# Patient Record
Sex: FEMALE | Race: WHITE | NOT HISPANIC OR LATINO | Employment: OTHER | URBAN - METROPOLITAN AREA
[De-identification: names, ages, dates, MRNs, and addresses within clinical notes are randomized per-mention and may not be internally consistent; named-entity substitution may affect disease eponyms.]

---

## 2023-11-16 RX ORDER — NAPROXEN SODIUM 220 MG/1
81 TABLET, FILM COATED ORAL DAILY
COMMUNITY

## 2023-11-16 RX ORDER — LISINOPRIL 20 MG/1
20 TABLET ORAL DAILY
COMMUNITY

## 2023-11-16 RX ORDER — PANTOPRAZOLE SODIUM 40 MG/1
40 TABLET, DELAYED RELEASE ORAL DAILY
COMMUNITY

## 2023-11-16 RX ORDER — ATORVASTATIN CALCIUM 80 MG/1
80 TABLET, FILM COATED ORAL DAILY
COMMUNITY

## 2023-11-16 RX ORDER — AMLODIPINE BESYLATE 2.5 MG/1
2.5 TABLET ORAL DAILY
COMMUNITY

## 2023-11-16 RX ORDER — EZETIMIBE 10 MG/1
10 TABLET ORAL DAILY
COMMUNITY

## 2023-11-20 NOTE — DISCHARGE INSTRUCTIONS
FOOT SURGERY HOME CARE INSTRUCTIONS     ACTIVITY. Keep your affected foot elevated above the level of your heart over the next 48 hours. Apply an ice bag as often as possible for the next 48 hours. If you received a surgical boot or shoe, wear it until your next visit or until instructed otherwise. You may remove the surgical boot or shoe when resting and to sleep. Exercise your legs frequently by bending your knees to stimulate circulation and speed healing. You may put partial weight on your affected foot.     DIET. At home, continue with liquids. If you do not have any nausea, you may eat a soft diet. Gradually resume your regular diet. Do not drink alcohol beverages.     CARE OF INCISION. Keep your bandage clean, dry, and intact until your follow up appointment. Do not remove your bandages or inspect the wound. A small amount of blood on the bandage is normal. Limited swelling may occur and your skin may look bruised. Limited swelling and bruising are normal and expected. Do not smoke.     BATHING. Sponge bathe until your follow up appointment. You may take a shower when told by your doctor. No tub baths, swimming, or hot tubs for 2 weeks or when told by your doctor.     MEDICATION. You will receive instructions for your pain and/or antibiotic prescriptions. Take pain medication only if you need it and as directed. Take antibiotics as directed until all the pills are gone. If your medications cause stomach upset, headache, rash, or other abnormal reactions, stop taking them and call your doctor immediately.     WHEN TO CALL THE DOCTOR   Pain, burning, or numbness of the toes not relieved by elevation of the leg   Pale or cold toes, bluish nail beds   Red line going up leg   Excessive swelling   Fever over 100.4 degrees or higher and chills  Pain not relieved by the pain medication   Excessive bloody drainage or bandage become overly stained with bloody fluids   Your cast/bandage gets wet or you bump or injure  the surgical site

## 2023-11-21 ENCOUNTER — HOSPITAL ENCOUNTER (OUTPATIENT)
Facility: HOSPITAL | Age: 73
Discharge: HOME | End: 2023-11-21
Attending: PODIATRIST | Admitting: PODIATRIST
Payer: MEDICARE

## 2023-11-21 ENCOUNTER — ANESTHESIA (OUTPATIENT)
Dept: SURGERY | Facility: HOSPITAL | Age: 73
End: 2023-11-21
Payer: MEDICARE

## 2023-11-21 ENCOUNTER — ANESTHESIA EVENT (OUTPATIENT)
Dept: SURGERY | Facility: HOSPITAL | Age: 73
End: 2023-11-21
Payer: MEDICARE

## 2023-11-21 DIAGNOSIS — M20.22 HALLUX RIGIDUS OF LEFT FOOT: Primary | Chronic | ICD-10-CM

## 2023-11-21 PROCEDURE — 37000008 HC ANESTHESIA 1ST 15 MINUTES: Performed by: PODIATRIST

## 2023-11-21 PROCEDURE — 63600175 PHARM REV CODE 636 W HCPCS: Performed by: ANESTHESIOLOGY

## 2023-11-21 PROCEDURE — 63600175 PHARM REV CODE 636 W HCPCS: Performed by: NURSE ANESTHETIST, CERTIFIED REGISTERED

## 2023-11-21 PROCEDURE — 63600175 PHARM REV CODE 636 W HCPCS: Performed by: PODIATRIST

## 2023-11-21 PROCEDURE — D9220A PRA ANESTHESIA: ICD-10-PCS | Mod: ANES,,, | Performed by: ANESTHESIOLOGY

## 2023-11-21 PROCEDURE — D9220A PRA ANESTHESIA: ICD-10-PCS | Mod: CRNA,,, | Performed by: NURSE ANESTHETIST, CERTIFIED REGISTERED

## 2023-11-21 PROCEDURE — 36000708 HC OR TIME LEV III 1ST 15 MIN: Performed by: PODIATRIST

## 2023-11-21 PROCEDURE — 37000009 HC ANESTHESIA EA ADD 15 MINS: Performed by: PODIATRIST

## 2023-11-21 PROCEDURE — 25000003 PHARM REV CODE 250: Performed by: ANESTHESIOLOGY

## 2023-11-21 PROCEDURE — 25000003 PHARM REV CODE 250: Performed by: NURSE ANESTHETIST, CERTIFIED REGISTERED

## 2023-11-21 PROCEDURE — D9220A PRA ANESTHESIA: Mod: ANES,,, | Performed by: ANESTHESIOLOGY

## 2023-11-21 PROCEDURE — D9220A PRA ANESTHESIA: Mod: CRNA,,, | Performed by: NURSE ANESTHETIST, CERTIFIED REGISTERED

## 2023-11-21 PROCEDURE — 27201423 OPTIME MED/SURG SUP & DEVICES STERILE SUPPLY: Performed by: PODIATRIST

## 2023-11-21 PROCEDURE — 71000016 HC POSTOP RECOV ADDL HR: Performed by: PODIATRIST

## 2023-11-21 PROCEDURE — C1713 ANCHOR/SCREW BN/BN,TIS/BN: HCPCS | Performed by: PODIATRIST

## 2023-11-21 PROCEDURE — 71000033 HC RECOVERY, INTIAL HOUR: Performed by: PODIATRIST

## 2023-11-21 PROCEDURE — 25000003 PHARM REV CODE 250: Performed by: PODIATRIST

## 2023-11-21 PROCEDURE — 36000709 HC OR TIME LEV III EA ADD 15 MIN: Performed by: PODIATRIST

## 2023-11-21 PROCEDURE — 71000015 HC POSTOP RECOV 1ST HR: Performed by: PODIATRIST

## 2023-11-21 DEVICE — IMPLANTABLE DEVICE: Type: IMPLANTABLE DEVICE | Site: FOOT | Status: FUNCTIONAL

## 2023-11-21 RX ORDER — SODIUM CHLORIDE, SODIUM LACTATE, POTASSIUM CHLORIDE, CALCIUM CHLORIDE 600; 310; 30; 20 MG/100ML; MG/100ML; MG/100ML; MG/100ML
INJECTION, SOLUTION INTRAVENOUS CONTINUOUS
Status: DISCONTINUED | OUTPATIENT
Start: 2023-11-21 | End: 2023-11-21 | Stop reason: HOSPADM

## 2023-11-21 RX ORDER — HYDROMORPHONE HYDROCHLORIDE 2 MG/ML
0.2 INJECTION, SOLUTION INTRAMUSCULAR; INTRAVENOUS; SUBCUTANEOUS EVERY 10 MIN PRN
Status: DISCONTINUED | OUTPATIENT
Start: 2023-11-21 | End: 2023-11-21

## 2023-11-21 RX ORDER — ONDANSETRON 2 MG/ML
4 INJECTION INTRAMUSCULAR; INTRAVENOUS ONCE
Status: COMPLETED | OUTPATIENT
Start: 2023-11-21 | End: 2023-11-21

## 2023-11-21 RX ORDER — DEXAMETHASONE SODIUM PHOSPHATE 4 MG/ML
INJECTION, SOLUTION INTRA-ARTICULAR; INTRALESIONAL; INTRAMUSCULAR; INTRAVENOUS; SOFT TISSUE
Status: DISCONTINUED | OUTPATIENT
Start: 2023-11-21 | End: 2023-11-21

## 2023-11-21 RX ORDER — MEPERIDINE HYDROCHLORIDE 25 MG/ML
12.5 INJECTION INTRAMUSCULAR; INTRAVENOUS; SUBCUTANEOUS EVERY 10 MIN PRN
Status: DISCONTINUED | OUTPATIENT
Start: 2023-11-21 | End: 2023-11-21 | Stop reason: HOSPADM

## 2023-11-21 RX ORDER — CEFAZOLIN SODIUM 1 G/3ML
INJECTION, POWDER, FOR SOLUTION INTRAMUSCULAR; INTRAVENOUS
Status: DISCONTINUED | OUTPATIENT
Start: 2023-11-21 | End: 2023-11-21 | Stop reason: HOSPADM

## 2023-11-21 RX ORDER — CELECOXIB 100 MG/1
100 CAPSULE ORAL ONCE
Status: DISCONTINUED | OUTPATIENT
Start: 2023-11-21 | End: 2023-11-21

## 2023-11-21 RX ORDER — LIDOCAINE HYDROCHLORIDE 10 MG/ML
INJECTION INFILTRATION; PERINEURAL
Status: DISCONTINUED
Start: 2023-11-21 | End: 2023-11-21 | Stop reason: HOSPADM

## 2023-11-21 RX ORDER — ACETAMINOPHEN 325 MG/1
650 TABLET ORAL
Status: COMPLETED | OUTPATIENT
Start: 2023-11-21 | End: 2023-11-21

## 2023-11-21 RX ORDER — SODIUM CHLORIDE, SODIUM LACTATE, POTASSIUM CHLORIDE, CALCIUM CHLORIDE 600; 310; 30; 20 MG/100ML; MG/100ML; MG/100ML; MG/100ML
INJECTION, SOLUTION INTRAVENOUS CONTINUOUS
Status: ACTIVE | OUTPATIENT
Start: 2023-11-21 | End: 2023-11-21

## 2023-11-21 RX ORDER — PROPOFOL 10 MG/ML
VIAL (ML) INTRAVENOUS
Status: DISCONTINUED | OUTPATIENT
Start: 2023-11-21 | End: 2023-11-21

## 2023-11-21 RX ORDER — FENTANYL CITRATE 50 UG/ML
INJECTION, SOLUTION INTRAMUSCULAR; INTRAVENOUS
Status: DISCONTINUED | OUTPATIENT
Start: 2023-11-21 | End: 2023-11-21

## 2023-11-21 RX ORDER — CEFAZOLIN SODIUM 1 G/3ML
1 INJECTION, POWDER, FOR SOLUTION INTRAMUSCULAR; INTRAVENOUS
Status: COMPLETED | OUTPATIENT
Start: 2023-11-21 | End: 2023-11-21

## 2023-11-21 RX ORDER — CEFAZOLIN SODIUM 1 G/3ML
INJECTION, POWDER, FOR SOLUTION INTRAMUSCULAR; INTRAVENOUS
Status: DISCONTINUED
Start: 2023-11-21 | End: 2023-11-21 | Stop reason: HOSPADM

## 2023-11-21 RX ORDER — BUPIVACAINE HYDROCHLORIDE 5 MG/ML
INJECTION, SOLUTION EPIDURAL; INTRACAUDAL
Status: DISCONTINUED
Start: 2023-11-21 | End: 2023-11-21 | Stop reason: HOSPADM

## 2023-11-21 RX ORDER — LIDOCAINE HYDROCHLORIDE 10 MG/ML
INJECTION, SOLUTION EPIDURAL; INFILTRATION; INTRACAUDAL; PERINEURAL
Status: DISCONTINUED | OUTPATIENT
Start: 2023-11-21 | End: 2023-11-21

## 2023-11-21 RX ORDER — SODIUM CHLORIDE 9 MG/ML
INJECTION, SOLUTION INTRAMUSCULAR; INTRAVENOUS; SUBCUTANEOUS
Status: DISCONTINUED
Start: 2023-11-21 | End: 2023-11-21 | Stop reason: HOSPADM

## 2023-11-21 RX ORDER — EPHEDRINE SULFATE 50 MG/ML
INJECTION, SOLUTION INTRAVENOUS
Status: DISCONTINUED | OUTPATIENT
Start: 2023-11-21 | End: 2023-11-21

## 2023-11-21 RX ORDER — LIDOCAINE HYDROCHLORIDE 10 MG/ML
INJECTION INFILTRATION; PERINEURAL
Status: DISCONTINUED | OUTPATIENT
Start: 2023-11-21 | End: 2023-11-21 | Stop reason: HOSPADM

## 2023-11-21 RX ORDER — ONDANSETRON 2 MG/ML
4 INJECTION INTRAMUSCULAR; INTRAVENOUS ONCE AS NEEDED
Status: DISCONTINUED | OUTPATIENT
Start: 2023-11-21 | End: 2023-11-21 | Stop reason: HOSPADM

## 2023-11-21 RX ORDER — HYDROMORPHONE HYDROCHLORIDE 2 MG/ML
0.4 INJECTION, SOLUTION INTRAMUSCULAR; INTRAVENOUS; SUBCUTANEOUS EVERY 5 MIN PRN
Status: DISCONTINUED | OUTPATIENT
Start: 2023-11-21 | End: 2023-11-21 | Stop reason: HOSPADM

## 2023-11-21 RX ORDER — CELECOXIB 200 MG/1
200 CAPSULE ORAL ONCE
Status: COMPLETED | OUTPATIENT
Start: 2023-11-21 | End: 2023-11-21

## 2023-11-21 RX ORDER — METHOCARBAMOL 100 MG/ML
1000 INJECTION, SOLUTION INTRAMUSCULAR; INTRAVENOUS ONCE AS NEEDED
Status: COMPLETED | OUTPATIENT
Start: 2023-11-21 | End: 2023-11-21

## 2023-11-21 RX ORDER — BUPIVACAINE HYDROCHLORIDE 5 MG/ML
INJECTION, SOLUTION EPIDURAL; INTRACAUDAL
Status: DISCONTINUED | OUTPATIENT
Start: 2023-11-21 | End: 2023-11-21 | Stop reason: HOSPADM

## 2023-11-21 RX ADMIN — CEFAZOLIN 1 G: 330 INJECTION, POWDER, FOR SOLUTION INTRAMUSCULAR; INTRAVENOUS at 01:11

## 2023-11-21 RX ADMIN — SODIUM CHLORIDE, POTASSIUM CHLORIDE, SODIUM LACTATE AND CALCIUM CHLORIDE: 600; 310; 30; 20 INJECTION, SOLUTION INTRAVENOUS at 02:11

## 2023-11-21 RX ADMIN — EPHEDRINE SULFATE 10 MG: 50 INJECTION INTRAVENOUS at 01:11

## 2023-11-21 RX ADMIN — ACETAMINOPHEN 650 MG: 325 TABLET, FILM COATED ORAL at 12:11

## 2023-11-21 RX ADMIN — ONDANSETRON 4 MG: 2 INJECTION INTRAMUSCULAR; INTRAVENOUS at 12:11

## 2023-11-21 RX ADMIN — CELECOXIB 200 MG: 200 CAPSULE ORAL at 12:11

## 2023-11-21 RX ADMIN — FENTANYL CITRATE 50 MCG: 50 INJECTION, SOLUTION INTRAMUSCULAR; INTRAVENOUS at 01:11

## 2023-11-21 RX ADMIN — EPHEDRINE SULFATE 5 MG: 50 INJECTION INTRAVENOUS at 01:11

## 2023-11-21 RX ADMIN — SODIUM CHLORIDE, POTASSIUM CHLORIDE, SODIUM LACTATE AND CALCIUM CHLORIDE: 600; 310; 30; 20 INJECTION, SOLUTION INTRAVENOUS at 01:11

## 2023-11-21 RX ADMIN — DEXAMETHASONE SODIUM PHOSPHATE 4 MG: 4 INJECTION, SOLUTION INTRA-ARTICULAR; INTRALESIONAL; INTRAMUSCULAR; INTRAVENOUS; SOFT TISSUE at 01:11

## 2023-11-21 RX ADMIN — METHOCARBAMOL 1000 MG: 100 INJECTION INTRAMUSCULAR; INTRAVENOUS at 03:11

## 2023-11-21 RX ADMIN — SODIUM CHLORIDE, POTASSIUM CHLORIDE, SODIUM LACTATE AND CALCIUM CHLORIDE: 600; 310; 30; 20 INJECTION, SOLUTION INTRAVENOUS at 12:11

## 2023-11-21 RX ADMIN — LIDOCAINE HYDROCHLORIDE 50 MG: 10 INJECTION, SOLUTION EPIDURAL; INFILTRATION; INTRACAUDAL; PERINEURAL at 01:11

## 2023-11-21 RX ADMIN — PROPOFOL 150 MG: 10 INJECTION, EMULSION INTRAVENOUS at 01:11

## 2023-11-21 NOTE — PLAN OF CARE
Problem: Infection  Goal: Absence of Infection Signs and Symptoms  Outcome: Met     Problem: Fall Injury Risk  Goal: Absence of Fall and Fall-Related Injury  Outcome: Met     Problem: Pain Acute  Goal: Acceptable Pain Control and Functional Ability  Outcome: Met     Problem: Hypertension Comorbidity  Goal: Blood Pressure in Desired Range  Outcome: Met     Problem: Adult Inpatient Plan of Care  Goal: Plan of Care Review  Outcome: Met  Goal: Patient-Specific Goal (Individualized)  Outcome: Met  Goal: Absence of Hospital-Acquired Illness or Injury  Outcome: Met  Goal: Optimal Comfort and Wellbeing  Outcome: Met  Goal: Readiness for Transition of Care  Outcome: Met

## 2023-11-21 NOTE — ANESTHESIA PREPROCEDURE EVALUATION
"                                                                                                             2023  Blanca Covington is a 72 y.o., female.  Pre-operative evaluation for Procedure(s) (LRB):  ARTHROPLASTY, MTP JOINT Tania and chielectomy with total joint implant Left foot (Left)  CHEILECTOMY (Left)    Blanca Covington is a 72 y.o. female     There is no problem list on file for this patient.      Review of patient's allergies indicates:  No Known Allergies    No current facility-administered medications on file prior to encounter.     Current Outpatient Medications on File Prior to Encounter   Medication Sig Dispense Refill    amLODIPine (NORVASC) 2.5 MG tablet Take 2.5 mg by mouth once daily.      aspirin 81 MG Chew Take 81 mg by mouth once daily.      atorvastatin (LIPITOR) 80 MG tablet Take 80 mg by mouth once daily.      ezetimibe (ZETIA) 10 mg tablet Take 10 mg by mouth once daily.      lisinopriL (PRINIVIL,ZESTRIL) 20 MG tablet Take 20 mg by mouth once daily.      multivitamin capsule Take 1 capsule by mouth once daily.      pantoprazole (PROTONIX) 40 MG tablet Take 40 mg by mouth once daily.         Past Surgical History:   Procedure Laterality Date    CATARACT EXTRACTION Bilateral     CERVICAL SPINE SURGERY       SECTION      FOOT SURGERY Right     LITHOTRIPSY Right      CBC: No results for input(s): "WBC", "RBC", "HGB", "HCT", "PLT", "MCV", "MCH", "MCHC" in the last 72 hours.    CMP: No results for input(s): "NA", "K", "CL", "CO2", "BUN", "CREATININE", "GLU", "MG", "PHOS", "CALCIUM", "ALBUMIN", "PROT", "ALKPHOS", "ALT", "AST", "BILITOT" in the last 72 hours.    INR  No results for input(s): "PT", "INR", "PROTIME", "APTT" in the last 72 hours.        Diagnostic Studies:  CXR :    EKG  2023: NSR=62. LAFB.  10/30/2023: NSR=62.      2D Echo :  No results found for this or any previous visit.    Pre-op Assessment    I have reviewed the Patient Summary Reports.     I have reviewed " the Nursing Notes. I have reviewed the NPO Status.   I have reviewed the Medications.     Review of Systems  Anesthesia Hx:  No problems with previous Anesthesia  Denies Glaucoma. History of prior surgery of interest to airway management or planning: cervical fusion. Previous anesthesia: General, Epidural, MAC 2020 Litho:; ; 2019 R toe Sx; 1993 Hysterectomy; 1980s Cerv Fusion:; with general anesthesia.    C/S 1990 with epidural.     2015 Phacos with MAC.  Airway issues documented on chart review include GETA     Denies Family Hx of Anesthesia complications.    Denies Personal Hx of Anesthesia complications.                    Social:  Non-Smoker, No Alcohol Use       Hematology/Oncology:  Hematology Normal   Oncology Normal                                   EENT/Dental:  EENT/Dental Normal           Cardiovascular:     Denies Pacemaker. Hypertension Valvular problems/Murmurs, MR  Denies MI. CAD  asymptomatic  Denies CABG/stent.        hyperlipidemia    CIS 10/16/23: Low risk. PAD: LE.  Echo 2/2023: EF=60%. 2+MR.1+TR.PASP=29.Carotid US 5/2020: No stenosis.  [employer] Stress 9/12/2018: Normal. rEF=59%, sEF=63%.  LHC 9/2010: mild CAD/luminal irregs.   Coronary Artery Disease:  Coronary Angiography ([facility]).  Coronary Angiogram Findings , left heart catherization was 9/2010: Mild , Non-Obstructive Disease           Mitral Regurgitation (MR), moderate, Tricuspid Regurgitation (TR), mild                  Hypertension      Disorder of Cardiac Conduction, Intraventricular Conduct Defect, Left Anterior Hemiblock    Pulmonary:  Pulmonary Normal                       Renal/:  Chronic Renal Disease renal calculi  Sl low eGFR=55., Cr=1. N.             Hepatic/GI:     GERD, well controlled   Protonix daily chr: No current Sx.          Musculoskeletal:  Musculoskeletal Normal                Neurological:  Neurology Normal Denies TIA.  Denies CVA.                                    Endocrine:  Endocrine Normal             Dermatological:  Skin Normal    Psych:  Psychiatric Normal                    Physical Exam  General: Well nourished, Cooperative, Alert and Oriented  Scratchy voice.  Airway:  Mallampati: III / I  Mouth Opening: Small, but > 3cm  TM Distance: > 6 cm  Tongue: Normal  Neck ROM: Normal ROM    Dental:  Dentures  Px denies any loose teeth. Bottom front set caps. Fractured  tiny part remained.  Chest/Lungs:  Normal Respiratory Rate    Heart:  Rate: Normal  Rhythm: Regular Rhythm    Abdomen:  Normal, Soft        Anesthesia Plan  Type of Anesthesia, risks & benefits discussed:    Anesthesia Type: Gen Supraglottic Airway  Intra-op Monitoring Plan: Standard ASA Monitors  Post Op Pain Control Plan: multimodal analgesia  Induction:  IV  Airway Plan: , Post-Induction  Informed Consent: Informed consent signed with the Patient and all parties understand the risks and agree with anesthesia plan.  All questions answered.   ASA Score: 3  Day of Surgery Review of History & Physical: H&P Update referred to the surgeon/provider.I have interviewed and examined the patient. I have reviewed the patient's H&P dated:     Ready For Surgery From Anesthesia Perspective.     .

## 2023-11-21 NOTE — ANESTHESIA POSTPROCEDURE EVALUATION
Anesthesia Post Evaluation    Patient: Blanca Covington    Procedure(s) Performed: Procedure(s) (LRB):  ARTHROPLASTY, MTP JOINT Tania and chielectomy with total joint implant Left foot (Left)  CHEILECTOMY (Left)    Final Anesthesia Type: general      Patient location during evaluation: PACU  Patient participation: Yes- Able to Participate  Level of consciousness: awake and alert, awake and oriented  Post-procedure vital signs: reviewed and stable  Pain management: adequate  Airway patency: patent    PONV status at discharge: No PONV  Anesthetic complications: no      Cardiovascular status: blood pressure returned to baseline, hemodynamically stable and stable  Respiratory status: unassisted, spontaneous ventilation and room air  Hydration status: euvolemic  Follow-up not needed.          Vitals Value Taken Time   /80 11/21/23 1512   Temp 36.5 °C (97.7 °F) 11/21/23 1444   Pulse 72 11/21/23 1516   Resp 16 11/21/23 1516   SpO2 91 % 11/21/23 1516   Vitals shown include unvalidated device data.      No case tracking events are documented in the log.      Pain/Brandon Score: Pain Rating Prior to Med Admin: 1 (11/21/2023 12:39 PM)  Brandon Score: 9 (11/21/2023  3:10 PM)

## 2023-11-21 NOTE — ANESTHESIA PROCEDURE NOTES
Intubation    Date/Time: 11/21/2023 1:15 PM    Performed by: Brittaney Matta CRNA  Authorized by: Kierra Barillas MD    Intubation:     Induction:  Intravenous    Intubated:  Postinduction    Mask Ventilation:  Easy mask    Attempts:  1    Attempted By:  CRNA    Method of Intubation:  Direct    Difficult Airway Encountered?: No      Complications:  None    Airway Device Size:  4.0    Inflation Amount (mL):  3    Secured at:  The lips    Placement Verified By:  Capnometry    Complicating Factors:  None    Findings Post-Intubation:  BS equal bilateral

## 2023-11-21 NOTE — TRANSFER OF CARE
"Anesthesia Transfer of Care Note    Patient: Blanca Covington    Procedure(s) Performed: Procedure(s) (LRB):  ARTHROPLASTY, MTP JOINT Tania and chielectomy with total joint implant Left foot (Left)  CHEILECTOMY (Left)    Patient location: PACU    Anesthesia Type: general    Transport from OR: Transported from OR on room air with adequate spontaneous ventilation    Post pain: adequate analgesia    Post assessment: no apparent anesthetic complications    Post vital signs: stable    Level of consciousness: responds to stimulation    Nausea/Vomiting: no nausea/vomiting    Complications: none    Transfer of care protocol was followed    Last vitals: Visit Vitals  BP (!) 175/89   Pulse 60   Temp 36.7 °C (98 °F) (Oral)   Resp 20   Ht 5' 6" (1.676 m)   Wt 72.4 kg (159 lb 9.8 oz)   SpO2 96%   Breastfeeding No   BMI 25.76 kg/m²     "

## 2023-11-21 NOTE — PROGRESS NOTES
Op site dressing noted, surrounding area soft and warm to the touch, color WDL.  Ice pack placed on site, foot elevated on a pillow

## 2023-11-22 VITALS
HEART RATE: 57 BPM | HEIGHT: 66 IN | DIASTOLIC BLOOD PRESSURE: 83 MMHG | TEMPERATURE: 98 F | RESPIRATION RATE: 18 BRPM | OXYGEN SATURATION: 94 % | BODY MASS INDEX: 25.66 KG/M2 | WEIGHT: 159.63 LBS | SYSTOLIC BLOOD PRESSURE: 153 MMHG

## 2023-11-30 NOTE — OP NOTE
[facility] [address] [place] [address]    PATIENT NAME:      SHADI PALMER  YOB: 1950  CSN:               [id number]  MRN:               [id number]  ADMIT DATE:        11/21/2023 11:09:00  PHYSICIAN:         Antonio Manning DPM                          OPERATIVE REPORT      DATE OF SURGERY:    11/21/2023 13:00:15    SURGEON:  Antonio Manning DPM    PREOPERATIVE DIAGNOSES:    1. Hallux rigidus, left foot.  2. 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    POSTOPERATIVE DIAGNOSES:    1. Hallux rigidus, left foot.  2. 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    NAME OF OPERATION:    1. A Wright arthroplasty with implant, left foot.  2. A cheilectomy with implant, left foot.    ANESTHESIA:  General with local.    HEMOSTASIS:  Pneumatic ankle tourniquet.    ESTIMATED BLOOD LOSS:  Minimal.    MATERIALS:  Integra total joint implant, size 40 x2.    INJECTABLES:  20 cc of 1:1 mixture of 1% lidocaine plain and 0.5% Marcaine   plain.    PROCEDURE IN DETAIL:  Under mild sedation, the patient was brought into the   operating room, placed on the operating table in the supine position.  A   pneumatic ankle tourniquet was then placed about the patient's left ankle.    Following IV sedation, local anesthesia was obtained about the left foot   utilizing 20 cc of 1:1 mixture of 1% lidocaine plain and 0.5% Marcaine plain.    The foot was then scrubbed, prepped, and draped in the usual aseptic manner.  An   Esmarch bandage was utilized to exsanguinate the patient's left foot and the   pneumatic ankle tourniquet was inflated to 250 mmHg.    Attention was then directed to the dorsal aspect of the 1st metatarsophalangeal   joint where a 6 cm linear longitudinal incision was made centered over the   dorsal aspect of this joint.  The incision was  deepened through the subcutaneous   tissue using sharp and blunt dissection.  Care was taken to identify and   retract all vital neural and vascular structures.  All bleeders were ligated and   cauterized as necessary.  At this time, a linear type capsulotomy was performed   over the dorsal aspect of the 1st metatarsophalangeal joint.  The periosteal   and capsular structures were freed of their osseous attachments and reflected   medially and laterally thus exposing the joint at the operative site.  All bony   prominences and spurs were removed at this time utilizing a rongeur and sagittal   bone saw and passed from the operative field.  Next, attention was then   directed to the base of the proximal phalanx of the hallux where utilizing a   sagittal bone saw a through-and-through transverse osteotomy was made from a   dorsal to plantar direction just distal to the articular surface.  At this time,   the distal joint was then freed of any soft tissue attachments and was removed   in toto and passed from the operative field.    Attention was then directed to the 1st metatarsal head, left foot where at this   time, following the stepwise approach for the implant set, the 1st metatarsal   head was reshaped for placement of the implant.  A trial was placed over the   head and a size 40 implant was decided for placement.    Attention was then directed to the base of the proximal phalanx and following   the stepwise technique for the implant set, this surface was reshaped and   fashioned for acceptance of the implant as well.  A trial was performed and once   again a size-40 implant was decided for placement to this side of the joint   also.  The wound was then flushed with copious amounts of sterile normal saline.    Next, the size 40 implant was then placed over the 1st metatarsal head with   the stem down the intramedullary shaft and the same was done for the base of the   proximal phalanx.  Both implants were  impacted into position and noted to be in   good position and solid with no movement or shifting noted to the implants.    The wound was then flushed once again with copious amounts of sterile normal   saline mixed with antibiotic solution.  At this time, the periosteum and   capsular structures were then reapproximated and coapted utilizing 2-0 Vicryl   and redundant capsular tissue was resected as necessary.  Skin closure was   obtained utilizing 4-0 Monocryl in a continuous running subcuticular suture   technique.    Upon completion of the procedures, the incision was dressed with Xeroform and   covered with sterile compressive dressing consisting of 4x4s and Kerlix.  The   pneumatic ankle tourniquet was then deflated.  An Ace wrap and postop shoe were   then applied.    The patient tolerated the procedures and anesthesia well.  She was transferred   to the recovery room with vital signs stable and vascular status intact to all   toes of the left foot.  Following a period of postoperative monitoring, the   patient will be discharged home per Anesthesia.        ______________________________  MACK Echavarria/AQS  DD:  11/30/2023  Time:  01:18AM  DT:  11/30/2023  Time:  01:55AM  Job #:  [id number]      OPERATIVE REPORT

## 2024-06-11 ENCOUNTER — HOSPITAL ENCOUNTER (OUTPATIENT)
Dept: CARDIOLOGY | Facility: HOSPITAL | Age: 74
Discharge: HOME | End: 2024-06-11
Attending: INTERNAL MEDICINE
Payer: MEDICARE

## 2024-06-11 VITALS
DIASTOLIC BLOOD PRESSURE: 88 MMHG | OXYGEN SATURATION: 97 % | TEMPERATURE: 98 F | SYSTOLIC BLOOD PRESSURE: 151 MMHG | HEART RATE: 70 BPM

## 2024-06-11 DIAGNOSIS — I48.19 PERSISTENT ATRIAL FIBRILLATION: Primary | ICD-10-CM

## 2024-06-11 DIAGNOSIS — I48.91 A-FIB: ICD-10-CM

## 2024-06-11 DIAGNOSIS — I48.19 PERSISTENT ATRIAL FIBRILLATION: ICD-10-CM

## 2024-06-11 LAB
OHS QRS DURATION: 84 MS
OHS QTC CALCULATION: 436 MS

## 2024-06-11 PROCEDURE — 93010 ELECTROCARDIOGRAM REPORT: CPT | Mod: ,,, | Performed by: INTERNAL MEDICINE

## 2024-06-11 PROCEDURE — 93005 ELECTROCARDIOGRAM TRACING: CPT

## (undated) DEVICE — COVER PROXIMA MAYO STAND

## (undated) DEVICE — BANDAGE MATRIX HK LOOP 4IN 5YD

## (undated) DEVICE — BLADE AGGR TOOTH MED 25X9

## (undated) DEVICE — GLOVE PROTEXIS PI SYN SURG 7.5

## (undated) DEVICE — CLOSURE SKIN STERI STRIP 1/2X4

## (undated) DEVICE — ELECTRODE PATIENT RETURN DISP

## (undated) DEVICE — BANDAGE ESMARK ELASTIC ST 4X9

## (undated) DEVICE — Device

## (undated) DEVICE — NDL HYPO REG 25G X 1 1/2

## (undated) DEVICE — NDL SYR 10ML 18X1.5 LL BLUNT

## (undated) DEVICE — BLANKET SNUGGLE WARM ADLT SM

## (undated) DEVICE — STOCKINET 4INX48

## (undated) DEVICE — STRIP MEDI WND CLSR 1/2X4IN

## (undated) DEVICE — DRAPE FULL SHEET 70X100IN

## (undated) DEVICE — GLOVE SENSICARE PI SURG 6.5

## (undated) DEVICE — DRAPE EXTREMITY ORTHOMAX

## (undated) DEVICE — GLOVE SENSICARE PI GRN 6.5

## (undated) DEVICE — SUT MCRYL PLUS 4-0 PS2 27IN

## (undated) DEVICE — BNDG COFLEX FOAM LF2 ST 3X5YD

## (undated) DEVICE — DRAPE C-ARM MINI POLY 54X84IN

## (undated) DEVICE — BLADE SURG STAINLESS STEEL #15

## (undated) DEVICE — BANDAGE GAUZE COT STRL 4.5X4.1

## (undated) DEVICE — CUFF ATS 2 PORT SNGL BLDR 18IN

## (undated) DEVICE — SOL NACL IRR 1000ML BTL

## (undated) DEVICE — ADHESIVE MASTISOL VIAL 48/BX

## (undated) DEVICE — GAUZE SPONGE 4X4 12PLY

## (undated) DEVICE — SUT CTD VICRYL 2.0

## (undated) DEVICE — BURR EGG/OVAL 4X55MM

## (undated) DEVICE — DRAPE U-DRAPE ADHESIVE 60X60IN